# Patient Record
(demographics unavailable — no encounter records)

---

## 2025-07-24 NOTE — SOCIAL HISTORY
[HIV Risk Factors: Heterosexual contact] : with naproxen sodium [Sexually Active] : The patient is not sexually active
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
## 2025-07-24 NOTE — DISCUSSION/SUMMARY
[15 min] : 15 min [HIV Education] : HIV Education [Transmission] : transmission [Universal Precautions] : universal precautions [Treatment Education] : treatment education [Treatment Adherence] : treatment adherence [Anticipatory Guidance] : anticipatory guidance [Partner Notification Info/Discussion] : partner notification info/discussion [Risk Reduction] : risk reduction [PrEP/PEP] : PrEP/PEP [The Topics Listed Above] : the topics listed above [The patient was able to ask questions and explain these concepts in his/her own words] : the patient was able to ask questions and explain these concepts in his/her own words
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
